# Patient Record
Sex: FEMALE | URBAN - METROPOLITAN AREA
[De-identification: names, ages, dates, MRNs, and addresses within clinical notes are randomized per-mention and may not be internally consistent; named-entity substitution may affect disease eponyms.]

---

## 2024-07-25 ENCOUNTER — OFFICE VISIT (OUTPATIENT)
Dept: URBAN - METROPOLITAN AREA CLINIC 98 | Facility: CLINIC | Age: 64
End: 2024-07-25

## 2024-09-12 ENCOUNTER — LAB OUTSIDE AN ENCOUNTER (OUTPATIENT)
Dept: URBAN - METROPOLITAN AREA CLINIC 98 | Facility: CLINIC | Age: 64
End: 2024-09-12

## 2024-09-12 ENCOUNTER — OFFICE VISIT (OUTPATIENT)
Dept: URBAN - METROPOLITAN AREA CLINIC 98 | Facility: CLINIC | Age: 64
End: 2024-09-12
Payer: COMMERCIAL

## 2024-09-12 VITALS
DIASTOLIC BLOOD PRESSURE: 81 MMHG | BODY MASS INDEX: 24.31 KG/M2 | TEMPERATURE: 97.1 F | HEART RATE: 73 BPM | HEIGHT: 64 IN | SYSTOLIC BLOOD PRESSURE: 144 MMHG | WEIGHT: 142.4 LBS

## 2024-09-12 DIAGNOSIS — K74.69 CRYPTOGENIC CIRRHOSIS: ICD-10-CM

## 2024-09-12 DIAGNOSIS — Z12.11 COLON CANCER SCREENING: ICD-10-CM

## 2024-09-12 PROCEDURE — 99244 OFF/OP CNSLTJ NEW/EST MOD 40: CPT | Performed by: INTERNAL MEDICINE

## 2024-09-12 PROCEDURE — 99204 OFFICE O/P NEW MOD 45 MIN: CPT | Performed by: INTERNAL MEDICINE

## 2024-09-12 RX ORDER — SODIUM, POTASSIUM,MAG SULFATES 17.5-3.13G
354ML SOLUTION, RECONSTITUTED, ORAL ORAL
Qty: 345 MILLILITER | Refills: 0 | OUTPATIENT
Start: 2024-09-14 | End: 2024-09-15

## 2024-09-12 NOTE — HPI-TODAY'S VISIT:
Patient referred by Jordan Caban MD for evaluation of recent Dx of liver cirrhosis Copy of this consult OV sent to Dr. Caban. 64 yo pt who has been asx and found to have elevated liver enzyme and radiologic evidence of PHTN. She has no major complaints. Has been on a healthy diet for some time. Denies abdominal pain, VINEET sxs. fatigue, pruritus, edema, coluria, rheumatologiic sxs, anorexia, weight loss or cardiorespiratory sxs. Labs w PCP 5/24: , , TB 1.9, , AST 57, ALT 57, FIB-4 4.17, Platelet 121K. A + P CT 5/24: enlarge L-lobe of the liver, nodular liver, wo parenchymal lesions, splenomegaly, recanalization of umbilical vein, L-diverticulosis, fat-containing UH,L-adrenal nodule. normal mammograma 4 mos ago and no recent GYN evaluation.

## 2024-09-14 ENCOUNTER — DASHBOARD ENCOUNTERS (OUTPATIENT)
Age: 64
End: 2024-09-14

## 2024-09-14 PROBLEM — 89580002: Status: ACTIVE | Noted: 2024-09-12

## 2024-09-18 ENCOUNTER — TELEPHONE ENCOUNTER (OUTPATIENT)
Dept: URBAN - METROPOLITAN AREA CLINIC 98 | Facility: CLINIC | Age: 64
End: 2024-09-18

## 2024-09-18 LAB
ACTIN (SMOOTH MUSCLE) ANTIBODY (IGG): <20
AFP, SERUM, TUMOR MARKER: 3
ALPHA-1-ANTITRYPSIN (AAT) PHENOTYPE: (no result)
AMMONIA (P): 136
ANACHOICE(R) SCREEN: NEGATIVE
FERRITIN, SERUM: 477
IMMUNOGLOBULIN A: 427
INR: 1.1
INTERPRETATION: (no result)
IRON BIND.CAP.(TIBC): 339
IRON SATURATION: 52
IRON: 177
MITOCHONDRIAL (M2) ANTIBODY: <=20
PREALBUMIN: 13
PT: 11.3
TISSUE TRANSGLUTAMINASE AB, IGA: <1
VITAMIN D,25-OH,TOTAL,IA: 20

## 2024-10-02 ENCOUNTER — LAB OUTSIDE AN ENCOUNTER (OUTPATIENT)
Dept: URBAN - METROPOLITAN AREA CLINIC 98 | Facility: CLINIC | Age: 64
End: 2024-10-02

## 2024-10-18 ENCOUNTER — TELEPHONE ENCOUNTER (OUTPATIENT)
Dept: URBAN - METROPOLITAN AREA CLINIC 98 | Facility: CLINIC | Age: 64
End: 2024-10-18

## 2024-11-14 ENCOUNTER — CLAIMS CREATED FROM THE CLAIM WINDOW (OUTPATIENT)
Dept: URBAN - METROPOLITAN AREA CLINIC 4 | Facility: CLINIC | Age: 64
End: 2024-11-14
Payer: COMMERCIAL

## 2024-11-14 ENCOUNTER — CLAIMS CREATED FROM THE CLAIM WINDOW (OUTPATIENT)
Dept: URBAN - METROPOLITAN AREA SURGERY CENTER 18 | Facility: SURGERY CENTER | Age: 64
End: 2024-11-14
Payer: COMMERCIAL

## 2024-11-14 DIAGNOSIS — K21.9 ACID REFLUX: ICD-10-CM

## 2024-11-14 DIAGNOSIS — K31.89 OTHER DISEASES OF STOMACH AND DUODENUM: ICD-10-CM

## 2024-11-14 DIAGNOSIS — K31.89 ACHYLIA: ICD-10-CM

## 2024-11-14 DIAGNOSIS — K57.30 DIVERTICULA OF COLON: ICD-10-CM

## 2024-11-14 DIAGNOSIS — Z12.11 COLON CANCER SCREENING (HIGH RISK): ICD-10-CM

## 2024-11-14 DIAGNOSIS — K21.9 GERD: ICD-10-CM

## 2024-11-14 DIAGNOSIS — K21.9 GASTRO-ESOPHAGEAL REFLUX DISEASE WITHOUT ESOPHAGITIS: ICD-10-CM

## 2024-11-14 DIAGNOSIS — Z12.11 COLON CANCER SCREENING: ICD-10-CM

## 2024-11-14 DIAGNOSIS — K29.60 ADENOPAPILLOMATOSIS GASTRICA: ICD-10-CM

## 2024-11-14 DIAGNOSIS — K29.60 OTHER GASTRITIS WITHOUT BLEEDING: ICD-10-CM

## 2024-11-14 DIAGNOSIS — K31.89 FOCAL FOVEOLAR HYPERPLASIA: ICD-10-CM

## 2024-11-14 PROCEDURE — 00813 ANES UPR LWR GI NDSC PX: CPT | Performed by: ANESTHESIOLOGY

## 2024-11-14 PROCEDURE — 0528F RCMND FLW-UP 10 YRS DOCD: CPT | Performed by: INTERNAL MEDICINE

## 2024-11-14 PROCEDURE — G0121 COLON CA SCRN NOT HI RSK IND: HCPCS | Performed by: INTERNAL MEDICINE

## 2024-11-14 PROCEDURE — 88342 IMHCHEM/IMCYTCHM 1ST ANTB: CPT | Performed by: PATHOLOGY

## 2024-11-14 PROCEDURE — 43239 EGD BIOPSY SINGLE/MULTIPLE: CPT | Performed by: INTERNAL MEDICINE

## 2024-11-14 PROCEDURE — 88312 SPECIAL STAINS GROUP 1: CPT | Performed by: PATHOLOGY

## 2024-11-14 PROCEDURE — 88305 TISSUE EXAM BY PATHOLOGIST: CPT | Performed by: PATHOLOGY

## 2024-11-18 ENCOUNTER — LAB OUTSIDE AN ENCOUNTER (OUTPATIENT)
Dept: URBAN - METROPOLITAN AREA CLINIC 98 | Facility: CLINIC | Age: 64
End: 2024-11-18

## 2024-11-18 ENCOUNTER — TELEPHONE ENCOUNTER (OUTPATIENT)
Dept: URBAN - METROPOLITAN AREA CLINIC 98 | Facility: CLINIC | Age: 64
End: 2024-11-18

## 2024-12-03 ENCOUNTER — TELEPHONE ENCOUNTER (OUTPATIENT)
Dept: URBAN - METROPOLITAN AREA CLINIC 98 | Facility: CLINIC | Age: 64
End: 2024-12-03

## 2024-12-12 ENCOUNTER — TELEPHONE ENCOUNTER (OUTPATIENT)
Dept: URBAN - METROPOLITAN AREA CLINIC 98 | Facility: CLINIC | Age: 64
End: 2024-12-12

## 2024-12-18 ENCOUNTER — OFFICE VISIT (OUTPATIENT)
Dept: URBAN - METROPOLITAN AREA CLINIC 98 | Facility: CLINIC | Age: 64
End: 2024-12-18
Payer: COMMERCIAL

## 2024-12-18 VITALS
HEART RATE: 65 BPM | WEIGHT: 137.6 LBS | TEMPERATURE: 97.3 F | BODY MASS INDEX: 23.49 KG/M2 | SYSTOLIC BLOOD PRESSURE: 108 MMHG | HEIGHT: 64 IN | DIASTOLIC BLOOD PRESSURE: 72 MMHG

## 2024-12-18 DIAGNOSIS — N28.1 RENAL CYST, LEFT: ICD-10-CM

## 2024-12-18 DIAGNOSIS — K74.60 UNSPECIFIED CIRRHOSIS OF LIVER: ICD-10-CM

## 2024-12-18 DIAGNOSIS — K82.4 GALLBLADDER POLYP: ICD-10-CM

## 2024-12-18 DIAGNOSIS — K75.81 NONALCOHOLIC STEATOHEPATITIS (NASH): ICD-10-CM

## 2024-12-18 DIAGNOSIS — K21.9 CHRONIC GERD: ICD-10-CM

## 2024-12-18 DIAGNOSIS — E55.9 VITAMIN D DEFICIENCY: ICD-10-CM

## 2024-12-18 DIAGNOSIS — K57.30 DIVERTICULOSIS OF COLON: ICD-10-CM

## 2024-12-18 PROCEDURE — 99214 OFFICE O/P EST MOD 30 MIN: CPT | Performed by: INTERNAL MEDICINE

## 2024-12-18 NOTE — HPI-TODAY'S VISIT:
65 yo pt w recent dx of BRITO - Cirrhosis w PHTN, compensated, here for follow-up. Has been doing well. Has no major complaints: denies abdominal pain, pruritus, fatigue, anorexia, weight loss, VINEET sxs and no melenic stools. Has been on a healthy diet for some time. Denies  coluria, rheumatologic nor cardiorespiratory sxs. Labs w PCP 5/24: , , TB 1.9, , AST 57, ALT 57, FIB-4 4.17, Platelet 121K. Labs 9/24: normal Irvin serology, ZABRINA, Fe, Ferritin, AFP, ASMA, AMA, A1-AT and PT / INR. Prealbumin 13, D3 20 and NH4 136. A + P CT scan: cirrhotic liver wo parenchymal lesions, sm GB polyp, splenomegaly, normal pancreas, L-adrenal adenoma, L-renal cyst, L-diverticulosis. EGD 11/24: No EV, GERD, sm HH, chronic, Hp-negative inactive gastritis and normal duodenal bx's. Colonoscopy 11/24: L-diverticulosis, I - E Hrrds. RUQ-US 11/24: nodular liver wo parenchymal lesions, normal Doppler HV, PV. HA, small GB polyp and small L-renal cysts, no ascites. Had  normal mammograms few mos ago and no recent GYN evaluation. She has no complaints after extensive ROS.

## 2024-12-21 PROBLEM — 266468003: Status: ACTIVE | Noted: 2024-12-21

## 2024-12-21 PROBLEM — 34713006: Status: ACTIVE | Noted: 2024-12-21

## 2024-12-21 PROBLEM — 19943007: Status: ACTIVE | Noted: 2024-12-21

## 2024-12-21 PROBLEM — 197433003: Status: ACTIVE | Noted: 2024-12-21

## 2024-12-21 PROBLEM — 235595009: Status: ACTIVE | Noted: 2024-12-21

## 2024-12-21 PROBLEM — 397881000: Status: ACTIVE | Noted: 2024-12-21

## 2025-06-20 ENCOUNTER — OFFICE VISIT (OUTPATIENT)
Dept: URBAN - METROPOLITAN AREA CLINIC 111 | Facility: CLINIC | Age: 65
End: 2025-06-20
Payer: COMMERCIAL

## 2025-06-20 ENCOUNTER — LAB OUTSIDE AN ENCOUNTER (OUTPATIENT)
Dept: URBAN - METROPOLITAN AREA CLINIC 111 | Facility: CLINIC | Age: 65
End: 2025-06-20

## 2025-06-20 DIAGNOSIS — K57.90 DIVERTICULOSIS: ICD-10-CM

## 2025-06-20 DIAGNOSIS — K21.9 CHRONIC GERD: ICD-10-CM

## 2025-06-20 DIAGNOSIS — K74.60 UNSPECIFIED CIRRHOSIS OF LIVER: ICD-10-CM

## 2025-06-20 DIAGNOSIS — K75.81 NONALCOHOLIC STEATOHEPATITIS (NASH): ICD-10-CM

## 2025-06-20 DIAGNOSIS — E55.9 VITAMIN D DEFICIENCY: ICD-10-CM

## 2025-06-20 DIAGNOSIS — K82.4 GALLBLADDER POLYP: ICD-10-CM

## 2025-06-20 DIAGNOSIS — N28.1 RENAL CYST, LEFT: ICD-10-CM

## 2025-06-20 PROCEDURE — 99214 OFFICE O/P EST MOD 30 MIN: CPT | Performed by: INTERNAL MEDICINE

## 2025-06-20 RX ORDER — FOLIC ACID 1 MG/1
1 TABLET TABLET ORAL ONCE A DAY
Qty: 90 TABLET | Refills: 3 | OUTPATIENT
Start: 2025-06-22

## 2025-06-20 RX ORDER — THIAMINE HCL 100 MG
1 TABLET TABLET ORAL ONCE A DAY
Qty: 90 TABLET | Refills: 3 | OUTPATIENT
Start: 2025-06-22

## 2025-06-20 RX ORDER — CARVEDILOL 6.25 MG/1
1 TABLET WITH FOOD TABLET, FILM COATED ORAL DAILY
Qty: 30 TABLET | Refills: 3 | OUTPATIENT
Start: 2025-06-22

## 2025-06-20 RX ORDER — B-COMPLEX WITH VITAMIN C
1 TABLET TABLET ORAL ONCE A DAY
Qty: 90 TABLET | Refills: 3 | OUTPATIENT
Start: 2025-06-22

## 2025-06-20 NOTE — HPI-TODAY'S VISIT:
63 yo pt w  BRITO - Cirrhosis w PHTN, compensated, here for follow-up. Has been doing well. Has no major complaints: denies abdominal pain, pruritus, fatigue, anorexia, weight loss, insomnia, VINEET sxs and no melenic stools. Has been on a healthy diet for some time, has been gaining weight , w increased po intake. Denies  coluria, rheumatologic nor cardiorespiratory sxs. Labs w PCP 5/24: , , TB 1.9, , AST 57, ALT 57, FIB-4 4.17, Platelet 121K. Labs 9/24: normal Irvin serology, ZABRINA, Fe, Ferritin, AFP, ASMA, AMA, A1-AT and PT / INR. Prealbumin 13, D3 20 and NH4 136. A + P CT scan: cirrhotic liver wo parenchymal lesions, sm GB polyp, splenomegaly, normal pancreas, L-adrenal adenoma, L-renal cyst, L-diverticulosis. EGD 11/24: No EV, GERD, sm HH, chronic, Hp-negative inactive gastritis and normal duodenal bx's. Colonoscopy 11/24: L-diverticulosis, I - E Hrrds. RUQ-US 11/24: nodular liver wo parenchymal lesions, normal Doppler HV, PV. HA, small GB polyp and small L-renal cysts, no ascites. Had  normal mammograms last year, and no recent GYN evaluation. She's due for routine labs. She has no complaints after extensive ROS.

## 2025-06-26 LAB
AFP, SERUM: 2.5
AFP-L3%, SERUM: (no result)
AMMONIA (P): 61
HEMATOCRIT: 37.7
HEMOGLOBIN: 13.1
INR: 1
MCH: 31.2
MCHC: 34.7
MCV: 89.8
MPV: 10.6
PLATELET COUNT: 120
PT: 11
RDW: 15.4
RED BLOOD CELL COUNT: 4.2
VITAMIN D,25-OH,TOTAL,IA: 25
WHITE BLOOD CELL COUNT: 4.2

## 2025-06-27 ENCOUNTER — TELEPHONE ENCOUNTER (OUTPATIENT)
Dept: URBAN - METROPOLITAN AREA CLINIC 111 | Facility: CLINIC | Age: 65
End: 2025-06-27

## 2025-07-02 ENCOUNTER — OFFICE VISIT (OUTPATIENT)
Dept: URBAN - METROPOLITAN AREA CLINIC 22 | Facility: CLINIC | Age: 65
End: 2025-07-02
Payer: COMMERCIAL

## 2025-07-02 DIAGNOSIS — K74.60 UNSPECIFIED CIRRHOSIS OF LIVER: ICD-10-CM

## 2025-07-02 DIAGNOSIS — K80.20 CHOLELITHIASIS: ICD-10-CM

## 2025-07-02 PROCEDURE — 76705 ECHO EXAM OF ABDOMEN: CPT | Performed by: INTERNAL MEDICINE

## 2025-07-18 ENCOUNTER — TELEPHONE ENCOUNTER (OUTPATIENT)
Dept: URBAN - METROPOLITAN AREA CLINIC 98 | Facility: CLINIC | Age: 65
End: 2025-07-18

## 2025-07-18 ENCOUNTER — LAB OUTSIDE AN ENCOUNTER (OUTPATIENT)
Dept: URBAN - METROPOLITAN AREA CLINIC 111 | Facility: CLINIC | Age: 65
End: 2025-07-18

## 2025-07-18 ENCOUNTER — OFFICE VISIT (OUTPATIENT)
Dept: URBAN - METROPOLITAN AREA CLINIC 111 | Facility: CLINIC | Age: 65
End: 2025-07-18

## 2025-07-18 RX ORDER — FOLIC ACID 1 MG/1
1 TABLET TABLET ORAL ONCE A DAY
Qty: 90 TABLET | Refills: 3 | Status: ACTIVE | COMMUNITY
Start: 2025-06-22

## 2025-07-18 RX ORDER — CARVEDILOL 6.25 MG/1
1 TABLET WITH FOOD TABLET, FILM COATED ORAL DAILY
Qty: 30 TABLET | Refills: 3 | Status: ACTIVE | COMMUNITY
Start: 2025-06-22

## 2025-07-18 RX ORDER — FOLIC ACID 1 MG/1
1 TABLET TABLET ORAL ONCE A DAY
Qty: 90 TABLET | Refills: 3 | OUTPATIENT
Start: 2025-07-18

## 2025-07-18 RX ORDER — CARVEDILOL 6.25 MG/1
1 TABLET WITH FOOD TABLET, FILM COATED ORAL DAILY
Qty: 30 TABLET | Refills: 3 | OUTPATIENT
Start: 2025-07-18

## 2025-07-18 RX ORDER — B-COMPLEX WITH VITAMIN C
1 TABLET TABLET ORAL ONCE A DAY
Qty: 90 TABLET | Refills: 3 | Status: ACTIVE | COMMUNITY
Start: 2025-06-22

## 2025-07-18 RX ORDER — B-COMPLEX WITH VITAMIN C
1 TABLET TABLET ORAL ONCE A DAY
Qty: 90 TABLET | Refills: 3 | OUTPATIENT
Start: 2025-07-18

## 2025-07-18 RX ORDER — THIAMINE HCL 100 MG
1 TABLET TABLET ORAL ONCE A DAY
Qty: 90 TABLET | Refills: 3 | OUTPATIENT
Start: 2025-07-18

## 2025-07-18 RX ORDER — THIAMINE HCL 100 MG
1 TABLET TABLET ORAL ONCE A DAY
Qty: 90 TABLET | Refills: 3 | Status: ACTIVE | COMMUNITY
Start: 2025-06-22

## 2025-08-18 ENCOUNTER — LAB OUTSIDE AN ENCOUNTER (OUTPATIENT)
Dept: URBAN - METROPOLITAN AREA CLINIC 98 | Facility: CLINIC | Age: 65
End: 2025-08-18